# Patient Record
Sex: FEMALE | HISPANIC OR LATINO | URBAN - METROPOLITAN AREA
[De-identification: names, ages, dates, MRNs, and addresses within clinical notes are randomized per-mention and may not be internally consistent; named-entity substitution may affect disease eponyms.]

---

## 2020-06-18 ENCOUNTER — OFFICE VISIT (OUTPATIENT)
Dept: URBAN - METROPOLITAN AREA CLINIC 98 | Facility: CLINIC | Age: 65
End: 2020-06-18
Payer: MEDICARE

## 2020-06-18 DIAGNOSIS — R10.11 ABDOMINAL PAIN: ICD-10-CM

## 2020-06-18 DIAGNOSIS — K62.89 RECTAL PAIN: ICD-10-CM

## 2020-06-18 DIAGNOSIS — Z12.11 COLON CANCER SCREENING: ICD-10-CM

## 2020-06-18 PROCEDURE — 3017F COLORECTAL CA SCREEN DOC REV: CPT | Performed by: INTERNAL MEDICINE

## 2020-06-18 PROCEDURE — G9903 PT SCRN TBCO ID AS NON USER: HCPCS | Performed by: INTERNAL MEDICINE

## 2020-06-18 PROCEDURE — 1036F TOBACCO NON-USER: CPT | Performed by: INTERNAL MEDICINE

## 2020-06-18 PROCEDURE — 99203 OFFICE O/P NEW LOW 30 MIN: CPT | Performed by: INTERNAL MEDICINE

## 2020-06-18 PROCEDURE — G8427 DOCREV CUR MEDS BY ELIG CLIN: HCPCS | Performed by: INTERNAL MEDICINE

## 2020-06-18 PROCEDURE — G8420 CALC BMI NORM PARAMETERS: HCPCS | Performed by: INTERNAL MEDICINE

## 2020-06-18 RX ORDER — SODIUM, POTASSIUM,MAG SULFATES 17.5-3.13G
177ML SOLUTION, RECONSTITUTED, ORAL ORAL
Qty: 177 MILLILITER | Refills: 0 | OUTPATIENT
Start: 2020-06-21 | End: 2020-06-22

## 2020-06-18 NOTE — HPI-OTHER HISTORIES
65 yo patient w/ hx macular degeneration s/p eye surgery in [facility] w/ complications, doing well. She admits that after eating at [employer], she started to have p-prandial bloating and abdominal distention. No N/V. She has been on a bland diet and has noted improvement in her sxs. She is due for CRC screening. No FhX oc CC / PP / IV+D. Has never had a colonoscopy. No other complaints. Denies constitutional sxs. Has been under stress due to  SARS-CoVid-19 pandemic.

## 2020-06-19 LAB
A/G RATIO: 1.7
ALBUMIN: 4.9
ALKALINE PHOSPHATASE: 102
ALT (SGPT): 33
AST (SGOT): 30
BASO (ABSOLUTE): 0
BASOS: 1
BILIRUBIN, TOTAL: 0.3
BUN/CREATININE RATIO: 19
BUN: 13
CALCIUM: 9.9
CARBON DIOXIDE, TOTAL: 22
CHLORIDE: 100
CREATININE: 0.7
EGFR IF AFRICN AM: 104
EGFR IF NONAFRICN AM: 91
EOS (ABSOLUTE): 0.1
EOS: 2
GLOBULIN, TOTAL: 2.9
GLUCOSE: 93
HEMATOCRIT: 40.8
HEMATOLOGY COMMENTS:: (no result)
HEMOGLOBIN: 13.7
IMMATURE CELLS: (no result)
IMMATURE GRANS (ABS): 0
IMMATURE GRANULOCYTES: 0
LIPASE: 29
LYMPHS (ABSOLUTE): 3.2
LYMPHS: 39
MCH: 31.1
MCHC: 33.6
MCV: 93
MONOCYTES(ABSOLUTE): 0.5
MONOCYTES: 6
NEUTROPHILS (ABSOLUTE): 4.4
NEUTROPHILS: 52
NRBC: (no result)
PLATELETS: 362
POTASSIUM: 4.7
PROTEIN, TOTAL: 7.8
RBC: 4.41
RDW: 14
SODIUM: 140
WBC: 8.3

## 2020-07-13 ENCOUNTER — OFFICE VISIT (OUTPATIENT)
Dept: URBAN - METROPOLITAN AREA CLINIC 115 | Facility: CLINIC | Age: 65
End: 2020-07-13
Payer: MEDICARE

## 2020-07-13 ENCOUNTER — LAB OUTSIDE AN ENCOUNTER (OUTPATIENT)
Dept: URBAN - METROPOLITAN AREA CLINIC 115 | Facility: CLINIC | Age: 65
End: 2020-07-13

## 2020-07-13 DIAGNOSIS — Z12.11 COLON CANCER SCREENING: ICD-10-CM

## 2020-07-13 DIAGNOSIS — R14.0 BLOATING AND GAS: ICD-10-CM

## 2020-07-13 DIAGNOSIS — K62.89 RECTAL PAIN: ICD-10-CM

## 2020-07-13 DIAGNOSIS — R10.11 ABDOMINAL PAIN: ICD-10-CM

## 2020-07-13 PROCEDURE — 99214 OFFICE O/P EST MOD 30 MIN: CPT | Performed by: INTERNAL MEDICINE

## 2020-07-13 PROCEDURE — 1036F TOBACCO NON-USER: CPT | Performed by: INTERNAL MEDICINE

## 2020-07-13 PROCEDURE — G8417 CALC BMI ABV UP PARAM F/U: HCPCS | Performed by: INTERNAL MEDICINE

## 2020-07-13 PROCEDURE — G8427 DOCREV CUR MEDS BY ELIG CLIN: HCPCS | Performed by: INTERNAL MEDICINE

## 2020-07-13 PROCEDURE — 3017F COLORECTAL CA SCREEN DOC REV: CPT | Performed by: INTERNAL MEDICINE

## 2020-07-13 RX ORDER — BISACODYL 5 MG
2 TABLET, DELAYED RELEASE (ENTERIC COATED) ORAL ONCE A DAY
Qty: 4 | OUTPATIENT
Start: 2020-07-13 | End: 2020-07-15

## 2020-07-13 RX ORDER — POLYETHYLENE GLYCOL 3350, SODIUM SULFATE ANHYDROUS, SODIUM BICARBONATE, SODIUM CHLORIDE, POTASSIUM CHLORIDE 227.1; 21.5; 6.36; 5.53; .754 G/L; G/L; G/L; G/L; G/L
236 GM POWDER, FOR SOLUTION ORAL
Qty: 1 GALLON | Refills: 0 | OUTPATIENT
Start: 2020-07-13 | End: 2020-07-14

## 2020-07-13 NOTE — HPI-TODAY'S VISIT:
STILL HAS GAS BLOATING, LIVES NEAR [place], SO WANTS TO HAVE COLONOSCOPY HERE. NOTICED CHANGE IN BM [facility], BM GOOD, NO BLEEDING CT DONE AT [facility] WAS NEGATIVE

## 2020-07-16 ENCOUNTER — OFFICE VISIT (OUTPATIENT)
Dept: URBAN - METROPOLITAN AREA SURGERY CENTER 18 | Facility: SURGERY CENTER | Age: 65
End: 2020-07-16

## 2020-07-24 ENCOUNTER — TELEPHONE ENCOUNTER (OUTPATIENT)
Dept: URBAN - METROPOLITAN AREA CLINIC 82 | Facility: CLINIC | Age: 65
End: 2020-07-24

## 2020-07-24 ENCOUNTER — TELEPHONE ENCOUNTER (OUTPATIENT)
Dept: URBAN - METROPOLITAN AREA CLINIC 92 | Facility: CLINIC | Age: 65
End: 2020-07-24

## 2020-07-24 RX ORDER — BISACODYL 5 MG
2 TABLET, DELAYED RELEASE (ENTERIC COATED) ORAL ONCE A DAY
Qty: 4 | OUTPATIENT
Start: 2020-07-24 | End: 2020-07-26

## 2020-07-24 RX ORDER — POLYETHYLENE GLYCOL 3350, SODIUM SULFATE ANHYDROUS, SODIUM BICARBONATE, SODIUM CHLORIDE, POTASSIUM CHLORIDE 227.1; 21.5; 6.36; 5.53; .754 G/L; G/L; G/L; G/L; G/L
236 GM POWDER, FOR SOLUTION ORAL
Qty: 1 GALLON | Refills: 0 | OUTPATIENT
Start: 2020-07-24 | End: 2020-07-25

## 2020-07-28 ENCOUNTER — OFFICE VISIT (OUTPATIENT)
Dept: URBAN - METROPOLITAN AREA SURGERY CENTER 13 | Facility: SURGERY CENTER | Age: 65
End: 2020-07-28
Payer: MEDICARE

## 2020-07-28 DIAGNOSIS — Z12.11 COLON CANCER SCREENING: ICD-10-CM

## 2020-07-28 PROCEDURE — G9935 CANC NOT DETECTD DURING SRCN: HCPCS | Performed by: INTERNAL MEDICINE

## 2020-07-28 PROCEDURE — G8907 PT DOC NO EVENTS ON DISCHARG: HCPCS | Performed by: INTERNAL MEDICINE

## 2020-07-28 PROCEDURE — G0121 COLON CA SCRN NOT HI RSK IND: HCPCS | Performed by: INTERNAL MEDICINE

## 2020-08-10 ENCOUNTER — DASHBOARD ENCOUNTERS (OUTPATIENT)
Age: 65
End: 2020-08-10

## 2020-08-10 ENCOUNTER — OFFICE VISIT (OUTPATIENT)
Dept: URBAN - METROPOLITAN AREA CLINIC 115 | Facility: CLINIC | Age: 65
End: 2020-08-10
Payer: MEDICARE

## 2020-08-10 DIAGNOSIS — R10.11 ABDOMINAL PAIN: ICD-10-CM

## 2020-08-10 DIAGNOSIS — K62.89 RECTAL PAIN: ICD-10-CM

## 2020-08-10 DIAGNOSIS — K64.9 HEMORRHOID: ICD-10-CM

## 2020-08-10 DIAGNOSIS — R14.0 BLOATING AND GAS: ICD-10-CM

## 2020-08-10 DIAGNOSIS — K57.90 DIVERTICULOSIS COLI: ICD-10-CM

## 2020-08-10 DIAGNOSIS — Z12.11 COLON CANCER SCREENING: ICD-10-CM

## 2020-08-10 PROBLEM — 21522001 ABDOMINAL PAIN: Status: ACTIVE | Noted: 2020-07-13

## 2020-08-10 PROBLEM — 271832001 FLATULENCE, ERUCTATION AND GAS PAIN: Status: ACTIVE | Noted: 2020-07-13

## 2020-08-10 PROBLEM — 398050005 DIVERTICULAR DISEASE OF COLON: Status: ACTIVE | Noted: 2020-08-10

## 2020-08-10 PROBLEM — 77880009 RECTAL PAIN: Status: ACTIVE | Noted: 2020-07-13

## 2020-08-10 PROBLEM — 70153002 HEMORRHOID: Status: ACTIVE | Noted: 2020-08-10

## 2020-08-10 PROBLEM — 275978004 COLON CANCER SCREENING: Status: ACTIVE | Noted: 2020-07-13

## 2020-08-10 PROCEDURE — G9903 PT SCRN TBCO ID AS NON USER: HCPCS | Performed by: INTERNAL MEDICINE

## 2020-08-10 PROCEDURE — G8427 DOCREV CUR MEDS BY ELIG CLIN: HCPCS | Performed by: INTERNAL MEDICINE

## 2020-08-10 PROCEDURE — 1036F TOBACCO NON-USER: CPT | Performed by: INTERNAL MEDICINE

## 2020-08-10 PROCEDURE — 91065 BREATH HYDROGEN/METHANE TEST: CPT | Performed by: INTERNAL MEDICINE

## 2020-08-10 PROCEDURE — G8417 CALC BMI ABV UP PARAM F/U: HCPCS | Performed by: INTERNAL MEDICINE

## 2020-08-10 PROCEDURE — 3017F COLORECTAL CA SCREEN DOC REV: CPT | Performed by: INTERNAL MEDICINE

## 2020-08-10 PROCEDURE — 99213 OFFICE O/P EST LOW 20 MIN: CPT | Performed by: INTERNAL MEDICINE

## 2020-08-10 NOTE — HPI-TODAY'S VISIT:
DOING MUCH BETTER, GAS IN EVENING. ALLERGIC TEST DONE FOUND ALMOND AND CHEST NUT. H PYLORI IS NEGATIVE.  PROBIOTICS IS OK, VITAMIN IS OK.
